# Patient Record
Sex: FEMALE | Race: WHITE | HISPANIC OR LATINO | URBAN - NONMETROPOLITAN AREA
[De-identification: names, ages, dates, MRNs, and addresses within clinical notes are randomized per-mention and may not be internally consistent; named-entity substitution may affect disease eponyms.]

---

## 2018-08-27 ENCOUNTER — HISTORICAL (OUTPATIENT)
Dept: ADMINISTRATIVE | Facility: HOSPITAL | Age: 28
End: 2018-08-27

## 2018-08-31 ENCOUNTER — HISTORICAL (OUTPATIENT)
Dept: ADMINISTRATIVE | Facility: HOSPITAL | Age: 28
End: 2018-08-31

## 2022-09-20 ENCOUNTER — TELEPHONE (OUTPATIENT)
Dept: OBSTETRICS AND GYNECOLOGY | Facility: CLINIC | Age: 32
End: 2022-09-20
Payer: MEDICAID

## 2024-03-02 ENCOUNTER — HOSPITAL ENCOUNTER (EMERGENCY)
Facility: HOSPITAL | Age: 34
Discharge: HOME | End: 2024-03-02
Attending: FAMILY MEDICINE
Payer: MEDICAID

## 2024-03-02 VITALS
WEIGHT: 179.38 LBS | HEART RATE: 92 BPM | DIASTOLIC BLOOD PRESSURE: 64 MMHG | TEMPERATURE: 98 F | SYSTOLIC BLOOD PRESSURE: 122 MMHG | BODY MASS INDEX: 35.22 KG/M2 | OXYGEN SATURATION: 100 % | HEIGHT: 60 IN | RESPIRATION RATE: 18 BRPM

## 2024-03-02 DIAGNOSIS — R11.2 NAUSEA AND VOMITING, UNSPECIFIED VOMITING TYPE: Primary | ICD-10-CM

## 2024-03-02 DIAGNOSIS — K52.9 GASTROENTERITIS: ICD-10-CM

## 2024-03-02 LAB
ALBUMIN SERPL BCP-MCNC: 4.2 G/DL (ref 3.5–5.2)
ALP SERPL-CCNC: 71 U/L (ref 55–135)
ALT SERPL W/O P-5'-P-CCNC: 22 U/L (ref 10–44)
ANION GAP SERPL CALC-SCNC: 14 MMOL/L (ref 8–16)
AST SERPL-CCNC: 20 U/L (ref 10–40)
BASOPHILS # BLD AUTO: 0.06 K/UL (ref 0–0.2)
BASOPHILS NFR BLD: 0.6 % (ref 0–1.9)
BILIRUB SERPL-MCNC: 0.4 MG/DL (ref 0.1–1)
BUN SERPL-MCNC: 9 MG/DL (ref 6–20)
CALCIUM SERPL-MCNC: 9.2 MG/DL (ref 8.7–10.5)
CHLORIDE SERPL-SCNC: 105 MMOL/L (ref 95–110)
CO2 SERPL-SCNC: 24 MMOL/L (ref 23–29)
CREAT SERPL-MCNC: 0.7 MG/DL (ref 0.5–1.4)
DIFFERENTIAL METHOD BLD: NORMAL
EOSINOPHIL # BLD AUTO: 0.1 K/UL (ref 0–0.5)
EOSINOPHIL NFR BLD: 0.8 % (ref 0–8)
ERYTHROCYTE [DISTWIDTH] IN BLOOD BY AUTOMATED COUNT: 12.8 % (ref 11.5–14.5)
EST. GFR  (NO RACE VARIABLE): >60 ML/MIN/1.73 M^2
GLUCOSE SERPL-MCNC: 87 MG/DL (ref 70–110)
HCT VFR BLD AUTO: 41.7 % (ref 37–48.5)
HCV AB SERPL QL IA: NEGATIVE
HEP C VIRUS HOLD SPECIMEN: NORMAL
HGB BLD-MCNC: 14.6 G/DL (ref 12–16)
HIV 1+2 AB+HIV1 P24 AG SERPL QL IA: NEGATIVE
IMM GRANULOCYTES # BLD AUTO: 0.03 K/UL (ref 0–0.04)
IMM GRANULOCYTES NFR BLD AUTO: 0.3 % (ref 0–0.5)
INR PPP: 1.1 (ref 0.8–1.2)
LYMPHOCYTES # BLD AUTO: 4.4 K/UL (ref 1–4.8)
LYMPHOCYTES NFR BLD: 43.8 % (ref 18–48)
MCH RBC QN AUTO: 30.1 PG (ref 27–31)
MCHC RBC AUTO-ENTMCNC: 35 G/DL (ref 32–36)
MCV RBC AUTO: 86 FL (ref 82–98)
MONOCYTES # BLD AUTO: 1 K/UL (ref 0.3–1)
MONOCYTES NFR BLD: 9.6 % (ref 4–15)
NEUTROPHILS # BLD AUTO: 4.5 K/UL (ref 1.8–7.7)
NEUTROPHILS NFR BLD: 44.9 % (ref 38–73)
NRBC BLD-RTO: 0 /100 WBC
PLATELET # BLD AUTO: 410 K/UL (ref 150–450)
PMV BLD AUTO: 10.4 FL (ref 9.2–12.9)
POTASSIUM SERPL-SCNC: 3.9 MMOL/L (ref 3.5–5.1)
PROT SERPL-MCNC: 8.2 G/DL (ref 6–8.4)
PROTHROMBIN TIME: 11.9 SEC (ref 9–12.5)
RBC # BLD AUTO: 4.85 M/UL (ref 4–5.4)
SODIUM SERPL-SCNC: 143 MMOL/L (ref 136–145)
WBC # BLD AUTO: 9.94 K/UL (ref 3.9–12.7)

## 2024-03-02 PROCEDURE — 96361 HYDRATE IV INFUSION ADD-ON: CPT

## 2024-03-02 PROCEDURE — 86803 HEPATITIS C AB TEST: CPT | Performed by: EMERGENCY MEDICINE

## 2024-03-02 PROCEDURE — 85025 COMPLETE CBC W/AUTO DIFF WBC: CPT | Performed by: FAMILY MEDICINE

## 2024-03-02 PROCEDURE — 85610 PROTHROMBIN TIME: CPT | Performed by: FAMILY MEDICINE

## 2024-03-02 PROCEDURE — 63600175 PHARM REV CODE 636 W HCPCS: Performed by: FAMILY MEDICINE

## 2024-03-02 PROCEDURE — 25000003 PHARM REV CODE 250: Performed by: FAMILY MEDICINE

## 2024-03-02 PROCEDURE — 87389 HIV-1 AG W/HIV-1&-2 AB AG IA: CPT | Performed by: EMERGENCY MEDICINE

## 2024-03-02 PROCEDURE — 96365 THER/PROPH/DIAG IV INF INIT: CPT

## 2024-03-02 PROCEDURE — 99285 EMERGENCY DEPT VISIT HI MDM: CPT | Mod: 25

## 2024-03-02 PROCEDURE — 80053 COMPREHEN METABOLIC PANEL: CPT | Performed by: FAMILY MEDICINE

## 2024-03-02 RX ORDER — PROMETHAZINE HYDROCHLORIDE 25 MG/1
25 TABLET ORAL EVERY 6 HOURS PRN
Qty: 15 TABLET | Refills: 0 | Status: SHIPPED | OUTPATIENT
Start: 2024-03-02

## 2024-03-02 RX ORDER — ONDANSETRON 4 MG/1
4 TABLET, ORALLY DISINTEGRATING ORAL
Status: COMPLETED | OUTPATIENT
Start: 2024-03-02 | End: 2024-03-02

## 2024-03-02 RX ORDER — ERGOCALCIFEROL 1.25 MG/1
50000 CAPSULE ORAL
COMMUNITY
Start: 2024-02-12

## 2024-03-02 RX ORDER — LANSOPRAZOLE 30 MG/1
30 CAPSULE, DELAYED RELEASE ORAL EVERY MORNING
COMMUNITY
Start: 2024-02-12

## 2024-03-02 RX ORDER — POLYETHYLENE GLYCOL 3350 17 G/17G
17 POWDER, FOR SOLUTION ORAL DAILY
COMMUNITY
Start: 2024-02-12

## 2024-03-02 RX ORDER — PANTOPRAZOLE SODIUM 40 MG/1
40 TABLET, DELAYED RELEASE ORAL DAILY
Status: DISCONTINUED | OUTPATIENT
Start: 2024-03-02 | End: 2024-03-02 | Stop reason: HOSPADM

## 2024-03-02 RX ORDER — FAMOTIDINE 20 MG/1
20 TABLET, FILM COATED ORAL 2 TIMES DAILY
Qty: 60 TABLET | Refills: 11 | Status: SHIPPED | OUTPATIENT
Start: 2024-03-02 | End: 2025-03-02

## 2024-03-02 RX ORDER — PANTOPRAZOLE SODIUM 40 MG/1
40 TABLET, DELAYED RELEASE ORAL DAILY
Status: DISCONTINUED | OUTPATIENT
Start: 2024-03-03 | End: 2024-03-02

## 2024-03-02 RX ORDER — ACETAMINOPHEN 500 MG
1000 TABLET ORAL
Status: COMPLETED | OUTPATIENT
Start: 2024-03-02 | End: 2024-03-02

## 2024-03-02 RX ADMIN — ACETAMINOPHEN 1000 MG: 500 TABLET ORAL at 05:03

## 2024-03-02 RX ADMIN — PROMETHAZINE HYDROCHLORIDE 25 MG: 25 INJECTION INTRAMUSCULAR; INTRAVENOUS at 05:03

## 2024-03-02 RX ADMIN — ONDANSETRON 4 MG: 4 TABLET, ORALLY DISINTEGRATING ORAL at 04:03

## 2024-03-02 RX ADMIN — SODIUM CHLORIDE 1000 ML: 9 INJECTION, SOLUTION INTRAVENOUS at 04:03

## 2024-03-02 RX ADMIN — PANTOPRAZOLE SODIUM 40 MG: 40 TABLET, DELAYED RELEASE ORAL at 04:03

## 2024-03-02 NOTE — ED PROVIDER NOTES
"SCRIBE #1 NOTE: I, Me-Makayla Anderson, am scribing for, and in the presence of, Verónica Jett MD. I have scribed the entire note.       History     Chief Complaint   Patient presents with    Vomiting     Vomiting x 3 days, pt states vomiting dark red blood. Pt also states has tarry stool     Review of patient's allergies indicates:   Allergen Reactions    Tramadol Anaphylaxis, Swelling and Other (See Comments)     " swollen throat"    Hydrocodone          History of Present Illness     HPI    3/2/2024, 4:23 PM  History obtained from the patient      History of Present Illness: Hortencia Monroy is a 33 y.o. female patient who presents to the Emergency Department for evaluation of vomiting which onset 3 days ago. Pt reports blood in her vomit. This is associated with nausea and epigastric abdominal pain. Pt states that she has had similar symptoms in the past, but says that it was never this severe. Symptoms are constant and moderate in severity. No mitigating or exacerbating factors reported. Patient denies any diarrhea, CP, SOB, fever, weakness, and all other sxs at this time. No prior Tx reported. Pt states that she has has a history of acid reflux and takes medications for it. No further complaints or concerns at this time.       Arrival mode: Personal vehicle     PCP: No, Primary Doctor        Past Medical History:  No past medical history on file.    Past Surgical History:  No past surgical history on file.      Family History:  No family history on file.    Social History:  Social History     Tobacco Use    Smoking status: Not on file    Smokeless tobacco: Not on file   Substance and Sexual Activity    Alcohol use: Not on file    Drug use: Not on file    Sexual activity: Not on file        Review of Systems     Review of Systems   Constitutional:  Negative for fever.   HENT:  Negative for sore throat.    Respiratory:  Negative for shortness of breath.    Cardiovascular:  Negative for chest pain. "   Gastrointestinal:  Positive for abdominal pain (epigastric), nausea and vomiting (hematemesis). Negative for diarrhea.   Genitourinary:  Negative for dysuria.   Musculoskeletal:  Negative for back pain.   Skin:  Negative for rash.   Neurological:  Negative for weakness.   All other systems reviewed and are negative.     Physical Exam     Initial Vitals [03/02/24 1545]   BP Pulse Resp Temp SpO2   116/69 101 18 97.5 °F (36.4 °C) 99 %      MAP       --          Physical Exam  Nursing Notes and Vital Signs Reviewed.  Constitutional: Patient is in mild distress. Well-developed and well-nourished.  Head: Atraumatic. Normocephalic.  Eyes: PERRL. EOM intact. Conjunctivae are not pale. No scleral icterus.  ENT: Mucous membranes are moist. Oropharynx is clear and symmetric.    Neck: Supple. Full ROM. No lymphadenopathy.  Cardiovascular: Regular rate. Regular rhythm. No murmurs, rubs, or gallops. Distal pulses are 2+ and symmetric.  Pulmonary/Chest: No respiratory distress. Clear to auscultation bilaterally. No wheezing or rales.  Abdominal: Soft and non-distended. Epigastric tenderness.  No rebound, guarding, or rigidity. Good bowel sounds.  Genitourinary: No CVA tenderness  Musculoskeletal: Moves all extremities. No obvious deformities. No edema. No calf tenderness.  Skin: Warm and dry.  Neurological:  Alert, awake, and appropriate. Normal speech. No acute focal neurological deficits are appreciated.  Psychiatric: Pt is anxious. Normal affect. Good eye contact. Appropriate in content.     ED Course   Procedures  ED Vital Signs:  Vitals:    03/02/24 1545 03/02/24 1803   BP: 116/69 122/64   Pulse: 101 92   Resp: 18    Temp: 97.5 °F (36.4 °C)    TempSrc: Oral    SpO2: 99% 100%   Weight: 81.4 kg (179 lb 5.5 oz)    Height: 5' (1.524 m)        Abnormal Lab Results:  Labs Reviewed   HIV 1 / 2 ANTIBODY    Narrative:     Release to patient->Immediate   HEPATITIS C ANTIBODY    Narrative:     Release to patient->Immediate   HEP C  VIRUS HOLD SPECIMEN    Narrative:     Release to patient->Immediate   CBC W/ AUTO DIFFERENTIAL    Narrative:     Release to patient->Immediate   COMPREHENSIVE METABOLIC PANEL    Narrative:     Release to patient->Immediate   PROTIME-INR    Narrative:     Release to patient->Immediate        All Lab Results:  Results for orders placed or performed during the hospital encounter of 03/02/24   HIV 1/2 Ag/Ab (4th Gen)   Result Value Ref Range    HIV 1/2 Ag/Ab Negative Negative   Hepatitis C Antibody   Result Value Ref Range    Hepatitis C Ab Negative Negative   HCV Virus Hold Specimen   Result Value Ref Range    HEP C Virus Hold Specimen Hold for HCV sendout    CBC auto differential   Result Value Ref Range    WBC 9.94 3.90 - 12.70 K/uL    RBC 4.85 4.00 - 5.40 M/uL    Hemoglobin 14.6 12.0 - 16.0 g/dL    Hematocrit 41.7 37.0 - 48.5 %    MCV 86 82 - 98 fL    MCH 30.1 27.0 - 31.0 pg    MCHC 35.0 32.0 - 36.0 g/dL    RDW 12.8 11.5 - 14.5 %    Platelets 410 150 - 450 K/uL    MPV 10.4 9.2 - 12.9 fL    Immature Granulocytes 0.3 0.0 - 0.5 %    Gran # (ANC) 4.5 1.8 - 7.7 K/uL    Immature Grans (Abs) 0.03 0.00 - 0.04 K/uL    Lymph # 4.4 1.0 - 4.8 K/uL    Mono # 1.0 0.3 - 1.0 K/uL    Eos # 0.1 0.0 - 0.5 K/uL    Baso # 0.06 0.00 - 0.20 K/uL    nRBC 0 0 /100 WBC    Gran % 44.9 38.0 - 73.0 %    Lymph % 43.8 18.0 - 48.0 %    Mono % 9.6 4.0 - 15.0 %    Eosinophil % 0.8 0.0 - 8.0 %    Basophil % 0.6 0.0 - 1.9 %    Differential Method Automated    Comprehensive metabolic panel   Result Value Ref Range    Sodium 143 136 - 145 mmol/L    Potassium 3.9 3.5 - 5.1 mmol/L    Chloride 105 95 - 110 mmol/L    CO2 24 23 - 29 mmol/L    Glucose 87 70 - 110 mg/dL    BUN 9 6 - 20 mg/dL    Creatinine 0.7 0.5 - 1.4 mg/dL    Calcium 9.2 8.7 - 10.5 mg/dL    Total Protein 8.2 6.0 - 8.4 g/dL    Albumin 4.2 3.5 - 5.2 g/dL    Total Bilirubin 0.4 0.1 - 1.0 mg/dL    Alkaline Phosphatase 71 55 - 135 U/L    AST 20 10 - 40 U/L    ALT 22 10 - 44 U/L    eGFR >60 >60  mL/min/1.73 m^2    Anion Gap 14 8 - 16 mmol/L   Protime-INR   Result Value Ref Range    Prothrombin Time 11.9 9.0 - 12.5 sec    INR 1.1 0.8 - 1.2         Imaging Results:  Imaging Results              CT Abdomen Pelvis  Without Contrast (Final result)  Result time 03/02/24 17:54:59      Final result by TIMOTEO Hughes Sr., MD (03/02/24 17:54:59)                   Impression:      1. Hepatomegaly  2. There is a small fat filled umbilical hernia. The width of the mouth of this hernia measures 8 mm.  All CT scans at this facility use dose modulation, iterative reconstruction, and/or weight base dosing when appropriate to reduce radiation dose when appropriate to reduce radiation dose to as low as reasonably achievable.      Electronically signed by: Benny Hughes MD  Date:    03/02/2024  Time:    17:54               Narrative:    EXAMINATION:  CT ABDOMEN PELVIS WITHOUT CONTRAST    CLINICAL HISTORY:  Abdominal pain, acute, nonlocalized;    TECHNIQUE:  Standard abdomen and pelvis CT protocol without IV contrast was performed    COMPARISON:  None    FINDINGS:  Finding: The size of the heart is within normal limits. The lungs are clear. There is no pneumothorax or pleural effusion.    The gallbladder is absent.  The liver is enlarged.  It measures 19.0 cm in craniocaudal dimension.  The pancreas, spleen, adrenals, and kidneys are normal in appearance. The ureters and the urinary bladder are normal in appearance.  The uterus and ovaries were not optimally visualized.  The appendix and the rest of the gastrointestinal system are normal in appearance. There is no free fluid within the abdomen or pelvis. There is no pneumoperitoneum.  There is a small fat filled umbilical hernia.  The width of the mouth of this hernia measures 8 mm.                                            The Emergency Provider reviewed the vital signs and test results, which are outlined above.     ED Discussion     4:57 PM: Nurse Kristal Miller RN,  witnessed bloody emesis and reports that it is rust in color.     6:19 PM: Reassessed pt at this time. Discussed with pt all pertinent ED information and results. Discussed pt dx and plan of tx. Gave pt all f/u and return to the ED instructions. All questions and concerns were addressed at this time. Pt expresses understanding of information and instructions, and is comfortable with plan to discharge. Pt is stable for discharge.    I discussed with patient and/or family/caretaker that evaluation in the ED does not suggest any emergent or life threatening medical conditions requiring immediate intervention beyond what was provided in the ED, and I believe patient is safe for discharge.  Regardless, an unremarkable evaluation in the ED does not preclude the development or presence of a serious of life threatening condition. As such, patient was instructed to return immediately for any worsening or change in current symptoms.         Medical Decision Making  Amount and/or Complexity of Data Reviewed  Labs: ordered. Decision-making details documented in ED Course.  Radiology: ordered. Decision-making details documented in ED Course.    Risk  OTC drugs.  Prescription drug management.                ED Medication(s):  Medications   sodium chloride 0.9% bolus 1,000 mL 1,000 mL (0 mLs Intravenous Stopped 3/2/24 1812)   ondansetron disintegrating tablet 4 mg (4 mg Oral Given 3/2/24 1645)   acetaminophen tablet 1,000 mg (1,000 mg Oral Given 3/2/24 1719)   promethazine (PHENERGAN) 25 mg in dextrose 5 % (D5W) 50 mL IVPB (0 mg Intravenous Stopped 3/2/24 1813)       Discharge Medication List as of 3/2/2024  6:04 PM        START taking these medications    Details   famotidine (PEPCID) 20 MG tablet Take 1 tablet (20 mg total) by mouth 2 (two) times daily., Starting Sat 3/2/2024, Until Sun 3/2/2025, Print      promethazine (PHENERGAN) 25 MG tablet Take 1 tablet (25 mg total) by mouth every 6 (six) hours as needed for Nausea.,  Starting Sat 3/2/2024, Print                     Scribe Attestation:   Scribe #1: I performed the above scribed service and the documentation accurately describes the services I performed. I attest to the accuracy of the note.     Attending:   Physician Attestation Statement for Scribe #1: I, Verónica Jett MD, personally performed the services described in this documentation, as scribed by Clau Anderson, in my presence, and it is both accurate and complete.           Clinical Impression       ICD-10-CM ICD-9-CM   1. Nausea and vomiting, unspecified vomiting type  R11.2 787.01   2. Gastroenteritis  K52.9 558.9       Disposition:   Disposition: Discharged  Condition: Stable         Verónica Jett MD  03/02/24 5629
